# Patient Record
Sex: FEMALE | Race: WHITE | URBAN - METROPOLITAN AREA
[De-identification: names, ages, dates, MRNs, and addresses within clinical notes are randomized per-mention and may not be internally consistent; named-entity substitution may affect disease eponyms.]

---

## 2021-04-09 ENCOUNTER — OFFICE VISIT (OUTPATIENT)
Dept: URBAN - METROPOLITAN AREA CLINIC 85 | Facility: CLINIC | Age: 66
End: 2021-04-09

## 2021-04-09 DIAGNOSIS — H43.813 BILATERAL VITREOUS DETACHMENT OF EYES: ICD-10-CM

## 2021-04-09 DIAGNOSIS — H25.13 BILATERAL NUCLEAR SCLEROSIS CATARACT: Primary | ICD-10-CM

## 2021-04-09 DIAGNOSIS — H25.12 NUCLEAR SCLEROSIS CATARACT OF LEFT EYE: ICD-10-CM

## 2021-04-09 PROCEDURE — 92004 COMPRE OPH EXAM NEW PT 1/>: CPT | Performed by: STUDENT IN AN ORGANIZED HEALTH CARE EDUCATION/TRAINING PROGRAM

## 2021-04-09 PROCEDURE — 92134 CPTRZ OPH DX IMG PST SGM RTA: CPT | Performed by: STUDENT IN AN ORGANIZED HEALTH CARE EDUCATION/TRAINING PROGRAM

## 2021-04-09 ASSESSMENT — INTRAOCULAR PRESSURE
OS: 15
OD: 15

## 2021-04-09 ASSESSMENT — VISUAL ACUITY
OD: 20/60
OS: 20/30

## 2021-04-09 ASSESSMENT — KERATOMETRY: OS: 44.75

## 2021-04-09 NOTE — IMPRESSION/PLAN
Impression: Bilateral nuclear sclerosis cataract: H25.13. Plan: Discussed myopic degeneration and reviewed RD signs and symptoms. Discussed cataracts, treatment options, and surgical risks/benefits with patient including bleeding, infection, capsular break, glaucoma, corneal clouding. Pt understands changing glasses will not improve vision/glare. Discussed Premium options available. Pt understands the need for glasses after surgery if advanced technology lenses are not chosen and that there is NO PERFECT IOL. Pt understands that even the highest ATLs have limitations including but not limited to Halos/Glare/Difficulty in Dim Lighting and Intermediate BCVA may need glasses after SX. Discussed large imbalance between OU after first eye. Patient elects surgical treatment. [facility] Recommend surgery OU. Aim OD: -0.50. Aim OS: -0.50. Recommend ORA. Recommend Dexycu + Moxifloxacin (PF) Intracameral Injection. Recommend monofocal IOL, and patient will need glasses after surgery. 
Recommend OD First

## 2021-04-09 NOTE — IMPRESSION/PLAN
Impression: Bilateral vitreous detachment of eyes: H43.813. Plan: No family history, prior peripheral retinal disease, or other risk factors evident. Warning signs of retinal tear and detachment discussed with patient. Advised patient to return to clinic STAT if any change in symptoms.

## 2021-06-17 ENCOUNTER — PRE-OPERATIVE VISIT (OUTPATIENT)
Dept: URBAN - METROPOLITAN AREA CLINIC 85 | Facility: CLINIC | Age: 66
End: 2021-06-17
Payer: COMMERCIAL

## 2021-06-17 PROCEDURE — 99213 OFFICE O/P EST LOW 20 MIN: CPT | Performed by: OPHTHALMOLOGY

## 2021-06-17 ASSESSMENT — KERATOMETRY
OD: 44.88
OS: 44.25

## 2021-06-17 ASSESSMENT — INTRAOCULAR PRESSURE
OD: 16
OS: 14

## 2021-06-17 ASSESSMENT — VISUAL ACUITY
OD: 20/60
OS: 20/30

## 2021-06-17 NOTE — IMPRESSION/PLAN
Impression: Vitreous degeneration, bilateral: H43.813. Plan: Discussed diagnosis. No treatment required. Discussed signs and symptoms of PVD. Discussed signs and symptoms of retinal detachment. Observe. Call if [address] worsens.

## 2021-06-17 NOTE — IMPRESSION/PLAN
Impression: Age-related nuclear cataract, bilateral: H25.13. Plan: Cataracts account for the patient's complaints. Discussed all risks, benefits, procedures and recovery. Patient has quality of life issues. Patient understands changing glasses will not improve vision. Discussed myopia findings,, OU. Patient desires to have surgery, recommend phacoemulsification with intraocular lens. CE w/IOL OD first then OS. R/B/A discussed. RL2. Lens: stabdard  Aim: plano. Dexycu  will be used. No drops necessary after surgery.

## 2021-06-23 ENCOUNTER — PROCEDURE (OUTPATIENT)
Dept: URBAN - METROPOLITAN AREA CLINIC 85 | Facility: CLINIC | Age: 66
End: 2021-06-23
Payer: COMMERCIAL

## 2021-06-23 DIAGNOSIS — Z01.818 ENCOUNTER FOR OTHER PREPROCEDURAL EXAMINATION: Primary | ICD-10-CM

## 2021-06-23 PROCEDURE — 99203 OFFICE O/P NEW LOW 30 MIN: CPT | Performed by: NURSE PRACTITIONER

## 2021-06-28 ENCOUNTER — SURGERY (OUTPATIENT)
Dept: URBAN - METROPOLITAN AREA SURGERY 55 | Facility: SURGERY | Age: 66
End: 2021-06-28
Payer: COMMERCIAL

## 2021-06-28 PROCEDURE — 66984 XCAPSL CTRC RMVL W/O ECP: CPT | Performed by: OPHTHALMOLOGY

## 2021-06-29 ENCOUNTER — POST-OPERATIVE VISIT (OUTPATIENT)
Dept: URBAN - METROPOLITAN AREA CLINIC 85 | Facility: CLINIC | Age: 66
End: 2021-06-29
Payer: COMMERCIAL

## 2021-06-29 PROCEDURE — 99024 POSTOP FOLLOW-UP VISIT: CPT | Performed by: OPHTHALMOLOGY

## 2021-06-29 NOTE — IMPRESSION/PLAN
Impression: S/P CE/Toric IOL OD - 1 Day. Encounter for surgical aftercare following surgery on a sense organ  Z48.810.  Plan:

## 2021-07-06 ENCOUNTER — POST-OPERATIVE VISIT (OUTPATIENT)
Dept: URBAN - METROPOLITAN AREA CLINIC 85 | Facility: CLINIC | Age: 66
End: 2021-07-06

## 2021-07-06 DIAGNOSIS — Z48.810 ENCOUNTER FOR SURGICAL AFTERCARE FOLLOWING SURGERY ON THE SENSE ORGANS: Primary | ICD-10-CM

## 2021-07-06 PROCEDURE — 99024 POSTOP FOLLOW-UP VISIT: CPT | Performed by: OPTOMETRIST

## 2021-07-06 ASSESSMENT — VISUAL ACUITY
OD: 20/20-2
OS: 20/30-2

## 2021-07-06 ASSESSMENT — INTRAOCULAR PRESSURE
OS: 15
OD: 16

## 2021-07-07 ENCOUNTER — SURGERY (OUTPATIENT)
Dept: URBAN - METROPOLITAN AREA SURGERY 55 | Facility: SURGERY | Age: 66
End: 2021-07-07
Payer: COMMERCIAL

## 2021-07-07 PROCEDURE — 66984 XCAPSL CTRC RMVL W/O ECP: CPT | Performed by: OPHTHALMOLOGY

## 2021-07-08 ENCOUNTER — POST-OPERATIVE VISIT (OUTPATIENT)
Dept: URBAN - METROPOLITAN AREA CLINIC 85 | Facility: CLINIC | Age: 66
End: 2021-07-08

## 2021-07-08 PROCEDURE — 99024 POSTOP FOLLOW-UP VISIT: CPT | Performed by: OPHTHALMOLOGY

## 2021-07-08 RX ORDER — KETOROLAC TROMETHAMINE 5 MG/ML
0.5 % SOLUTION OPHTHALMIC
Qty: 1 | Refills: 0 | Status: INACTIVE
Start: 2021-07-08 | End: 2022-01-31

## 2021-07-08 ASSESSMENT — INTRAOCULAR PRESSURE: OS: 22

## 2021-07-08 NOTE — IMPRESSION/PLAN
Impression: S/P CE/IOL-Monofocal Toric OS - 1 Day. Presence of intraocular lens  Z96.1. Plan: Initiate Ketorolac OS TID and Return as scheduled  The patient was instructed to contact their eye care provider ASAP if there should be any decrease in vision, pain, or any worsening of condition.

## 2021-07-28 ENCOUNTER — POST-OPERATIVE VISIT (OUTPATIENT)
Dept: URBAN - METROPOLITAN AREA CLINIC 85 | Facility: CLINIC | Age: 66
End: 2021-07-28

## 2021-07-28 DIAGNOSIS — Z96.1 PRESENCE OF INTRAOCULAR LENS: Primary | ICD-10-CM

## 2021-07-28 PROCEDURE — 99024 POSTOP FOLLOW-UP VISIT: CPT | Performed by: OPTOMETRIST

## 2021-07-28 ASSESSMENT — VISUAL ACUITY
OD: 20/20-1
OS: 20/20-1

## 2021-07-28 ASSESSMENT — INTRAOCULAR PRESSURE
OD: 15
OS: 15

## 2021-07-28 NOTE — IMPRESSION/PLAN
Impression: S/P Cataract Extraction by phacoemulsification with IOL placement; ORA OS - 21 Days. Presence of intraocular lens  Z96.1. Plan: The patient was instructed to RTC ASAP should they experience a decrease in vision, pain, or any worsening of condition.

## 2022-01-31 ENCOUNTER — OFFICE VISIT (OUTPATIENT)
Dept: URBAN - METROPOLITAN AREA CLINIC 85 | Facility: CLINIC | Age: 67
End: 2022-01-31
Payer: COMMERCIAL

## 2022-01-31 DIAGNOSIS — H26.493 OTHER SECONDARY CATARACT, BILATERAL: ICD-10-CM

## 2022-01-31 DIAGNOSIS — H53.2 DIPLOPIA: Primary | ICD-10-CM

## 2022-01-31 PROCEDURE — 92014 COMPRE OPH EXAM EST PT 1/>: CPT | Performed by: OPTOMETRIST

## 2022-01-31 ASSESSMENT — INTRAOCULAR PRESSURE
OD: 15
OS: 16

## 2022-01-31 ASSESSMENT — VISUAL ACUITY
OD: 20/20
OS: 20/20

## 2022-01-31 NOTE — IMPRESSION/PLAN
Impression: Diplopia: H53.2. Plan: Discussed intermittent diplopia consistent with intermittent decompensation of existing phoria. [facility] for prism evaluation soon.

## 2022-02-03 ENCOUNTER — OFFICE VISIT (OUTPATIENT)
Dept: URBAN - METROPOLITAN AREA CLINIC 82 | Facility: CLINIC | Age: 67
End: 2022-02-03
Payer: COMMERCIAL

## 2022-02-03 PROCEDURE — 99212 OFFICE O/P EST SF 10 MIN: CPT | Performed by: OPTOMETRIST

## 2022-02-03 ASSESSMENT — INTRAOCULAR PRESSURE
OS: 16
OD: 16

## 2022-02-03 ASSESSMENT — KERATOMETRY
OD: 44.88
OS: 44.38

## 2022-02-03 NOTE — IMPRESSION/PLAN
Impression: Diplopia: H53.2. Plan: No glasses RX given today. Patient apprehensive on wearing specs. Prism found today  5BI, 10BD OS. Patient will call for follow up. Call the office asap if any changes in vision or ocular complaints.

## 2022-06-13 ENCOUNTER — OFFICE VISIT (OUTPATIENT)
Dept: URBAN - METROPOLITAN AREA CLINIC 85 | Facility: CLINIC | Age: 67
End: 2022-06-13
Payer: COMMERCIAL

## 2022-06-13 DIAGNOSIS — H53.2 DIPLOPIA: Primary | ICD-10-CM

## 2022-06-13 DIAGNOSIS — H26.493 OTHER SECONDARY CATARACT, BILATERAL: ICD-10-CM

## 2022-06-13 DIAGNOSIS — H43.813 VITREOUS DEGENERATION, BILATERAL: ICD-10-CM

## 2022-06-13 PROCEDURE — 92014 COMPRE OPH EXAM EST PT 1/>: CPT | Performed by: OPTOMETRIST

## 2022-06-13 ASSESSMENT — INTRAOCULAR PRESSURE
OS: 16
OD: 16

## 2022-06-13 ASSESSMENT — VISUAL ACUITY
OS: 20/20
OD: 20/20

## 2022-06-13 NOTE — IMPRESSION/PLAN
Impression: Diplopia: H53.2. Plan: Discussed intermittent diplopia consistent with intermittent decompensation of existing phoria. See Dr. Rayo Méndez  for prism evaluation if desired.

## 2024-12-19 ENCOUNTER — OFFICE VISIT (OUTPATIENT)
Dept: URBAN - METROPOLITAN AREA CLINIC 85 | Facility: CLINIC | Age: 69
End: 2024-12-19
Payer: MEDICARE

## 2024-12-19 DIAGNOSIS — H43.813 VITREOUS DEGENERATION, BILATERAL: ICD-10-CM

## 2024-12-19 DIAGNOSIS — H26.493 OTHER SECONDARY CATARACT, BILATERAL: Primary | ICD-10-CM

## 2024-12-19 PROCEDURE — 99204 OFFICE O/P NEW MOD 45 MIN: CPT | Performed by: OPHTHALMOLOGY

## 2024-12-19 RX ORDER — PREDNISOLONE ACETATE 10 MG/ML
1 % SUSPENSION/ DROPS OPHTHALMIC
Qty: 1 | Refills: 1 | Status: ACTIVE
Start: 2024-12-19

## 2024-12-19 RX ORDER — LEVOTHYROXINE SODIUM 100 UG/1
CAPSULE ORAL
Qty: 0 | Refills: 0 | Status: ACTIVE
Start: 2024-12-19

## 2024-12-19 ASSESSMENT — VISUAL ACUITY
OS: 20/70
OD: 20/30

## 2024-12-19 ASSESSMENT — INTRAOCULAR PRESSURE
OS: 15
OD: 13

## 2025-01-06 ENCOUNTER — SURGERY (OUTPATIENT)
Dept: URBAN - METROPOLITAN AREA SURGERY 55 | Facility: SURGERY | Age: 70
End: 2025-01-06
Payer: MEDICARE

## 2025-01-06 PROCEDURE — 66821 AFTER CATARACT LASER SURGERY: CPT | Performed by: OPHTHALMOLOGY

## 2025-01-14 ENCOUNTER — SURGERY (OUTPATIENT)
Dept: URBAN - METROPOLITAN AREA SURGERY 55 | Facility: SURGERY | Age: 70
End: 2025-01-14
Payer: MEDICARE

## 2025-01-14 PROCEDURE — 66821 AFTER CATARACT LASER SURGERY: CPT | Performed by: OPHTHALMOLOGY
